# Patient Record
Sex: MALE | Race: BLACK OR AFRICAN AMERICAN | NOT HISPANIC OR LATINO | Employment: UNEMPLOYED | URBAN - METROPOLITAN AREA
[De-identification: names, ages, dates, MRNs, and addresses within clinical notes are randomized per-mention and may not be internally consistent; named-entity substitution may affect disease eponyms.]

---

## 2018-04-13 ENCOUNTER — HOSPITAL ENCOUNTER (EMERGENCY)
Facility: MEDICAL CENTER | Age: 48
End: 2018-04-13
Attending: EMERGENCY MEDICINE
Payer: COMMERCIAL

## 2018-04-13 ENCOUNTER — APPOINTMENT (OUTPATIENT)
Dept: RADIOLOGY | Facility: MEDICAL CENTER | Age: 48
End: 2018-04-13
Attending: EMERGENCY MEDICINE
Payer: COMMERCIAL

## 2018-04-13 VITALS
DIASTOLIC BLOOD PRESSURE: 106 MMHG | OXYGEN SATURATION: 96 % | RESPIRATION RATE: 16 BRPM | HEIGHT: 70 IN | WEIGHT: 201.5 LBS | HEART RATE: 72 BPM | SYSTOLIC BLOOD PRESSURE: 158 MMHG | TEMPERATURE: 99.5 F | BODY MASS INDEX: 28.85 KG/M2

## 2018-04-13 DIAGNOSIS — S20.211A CONTUSION OF RIGHT CHEST WALL, INITIAL ENCOUNTER: ICD-10-CM

## 2018-04-13 PROCEDURE — 700111 HCHG RX REV CODE 636 W/ 250 OVERRIDE (IP): Performed by: EMERGENCY MEDICINE

## 2018-04-13 PROCEDURE — A9270 NON-COVERED ITEM OR SERVICE: HCPCS | Performed by: EMERGENCY MEDICINE

## 2018-04-13 PROCEDURE — 90715 TDAP VACCINE 7 YRS/> IM: CPT | Performed by: EMERGENCY MEDICINE

## 2018-04-13 PROCEDURE — 700102 HCHG RX REV CODE 250 W/ 637 OVERRIDE(OP): Performed by: EMERGENCY MEDICINE

## 2018-04-13 PROCEDURE — 90471 IMMUNIZATION ADMIN: CPT

## 2018-04-13 PROCEDURE — 71046 X-RAY EXAM CHEST 2 VIEWS: CPT

## 2018-04-13 PROCEDURE — 99284 EMERGENCY DEPT VISIT MOD MDM: CPT

## 2018-04-13 RX ORDER — OXYCODONE AND ACETAMINOPHEN 10; 325 MG/1; MG/1
1 TABLET ORAL ONCE
Status: COMPLETED | OUTPATIENT
Start: 2018-04-13 | End: 2018-04-13

## 2018-04-13 RX ORDER — ACETAMINOPHEN 325 MG/1
650 TABLET ORAL ONCE
Status: COMPLETED | OUTPATIENT
Start: 2018-04-13 | End: 2018-04-13

## 2018-04-13 RX ORDER — TRAMADOL HYDROCHLORIDE 50 MG/1
50-100 TABLET ORAL EVERY 6 HOURS PRN
Qty: 25 TAB | Refills: 0 | Status: SHIPPED | OUTPATIENT
Start: 2018-04-13 | End: 2018-04-17

## 2018-04-13 RX ORDER — IBUPROFEN 600 MG/1
600 TABLET ORAL ONCE
Status: COMPLETED | OUTPATIENT
Start: 2018-04-13 | End: 2018-04-13

## 2018-04-13 RX ADMIN — ACETAMINOPHEN 650 MG: 325 TABLET, FILM COATED ORAL at 19:02

## 2018-04-13 RX ADMIN — CLOSTRIDIUM TETANI TOXOID ANTIGEN (FORMALDEHYDE INACTIVATED), CORYNEBACTERIUM DIPHTHERIAE TOXOID ANTIGEN (FORMALDEHYDE INACTIVATED), BORDETELLA PERTUSSIS TOXOID ANTIGEN (GLUTARALDEHYDE INACTIVATED), BORDETELLA PERTUSSIS FILAMENTOUS HEMAGGLUTININ ANTIGEN (FORMALDEHYDE INACTIVATED), BORDETELLA PERTUSSIS PERTACTIN ANTIGEN, AND BORDETELLA PERTUSSIS FIMBRIAE 2/3 ANTIGEN 0.5 ML: 5; 2; 2.5; 5; 3; 5 INJECTION, SUSPENSION INTRAMUSCULAR at 19:31

## 2018-04-13 RX ADMIN — OXYCODONE HYDROCHLORIDE AND ACETAMINOPHEN 1 TABLET: 10; 325 TABLET ORAL at 19:02

## 2018-04-13 RX ADMIN — IBUPROFEN 600 MG: 600 TABLET, FILM COATED ORAL at 19:02

## 2018-04-13 ASSESSMENT — PAIN SCALES - GENERAL
PAINLEVEL_OUTOF10: 8
PAINLEVEL_OUTOF10: 9

## 2018-04-13 NOTE — ED TRIAGE NOTES
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 Pt c/o right rib pain. Pt ambulatory.

## 2018-04-13 NOTE — LETTER
"  FORM C-4:  EMPLOYEE’S CLAIM FOR COMPENSATION/ REPORT OF INITIAL TREATMENT  EMPLOYEE’S CLAIM - PROVIDE ALL INFORMATION REQUESTED   First Name  Will Last Name  Eleuterio Birthdate             Age  1970 48 y.o. Sex  male Claim Number   Home Employee Address  [address] [facility]                                     Zip  [address] Height  1.778 m (5' 10\") Weight  91.4 kg (201 lb 8 oz) [facility]     Mailing Employee Address                           [address] [facility]               Zip  [address] Telephone  [phone] (home)  Primary Language Spoken  ENGLISH   Insurer  Unable to Obtain Third Party   [employer] CLAIMS [employer] Employee's Occupation (Job Title) When Injury or Occupational Disease Occurred  /   Employer's Name  [employer] Telephone  [phone]    Employer Address  [address]] Zip  [address]   Date of Injury  4/13/2018       Hour of Injury  3:20 PM Date Employer Notified  4/13/2018 Last Day of Work after Injury or Occupational Disease  4/13/2018 Supervisor to Whom Injury Reported  [facility]   Address or Location of Accident (if applicable)  [address]   What were you doing at the time of accident? 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 Be specific and answer in detail. Use additional sheet if necessary)  I WAS COMPLETING MY SHRED. WENT TO TURN AROUND MY LIMON CAUGHT THE CORNER OF THE LIFT AND I FELL OUT THE DOOR 4 FEET TO THE CONCRETE ON MY RIBS AND I ALSO CUT MY LEFT LEG.   If you believe that you have an occupational disease, when did you first have knowledge of the disability and it relationship to your employment?  N/A Witnesses to the Accident  A PASSER BY HE GOT THE [employer] & THEY CHECK TO SEE     Nature of Injury or Occupational Disease  Workers' Compensation  Part(s) of Body Injured or Affected  Chest, Lower Leg " (L), N/A    I certify that the above is true and correct to the best of my knowledge and that I have provided this information in order to obtain the benefits of [place]  I hereby authorize any physician, chiropractor, surgeon, practitioner, or other person, any hospital, including [facility] or [facility], any medical service organization, any insurance company, or other institution or organization to release to each other, any medical or other information, including benefits paid or payable, pertinent to this injury or disease, except information relative to diagnosis, treatment and/or counseling for AIDS, psychological conditions, alcohol or controlled substances, for which I must give specific authorization.  A Photostat of this authorization shall be as valid as the original.   Date  04/13/2018 [facility] Employee’s Signature   THIS REPORT MUST BE COMPLETED AND MAILED WITHIN 3 WORKING DAYS OF TREATMENT   Place  [facility], EMERGENCY DEPT  Name of Facility   [facility]   Date  4/13/2018 Diagnosis  (S20.211A) Contusion of right chest wall, initial encounter Is there evidence the injured employee was under the influence of alcohol and/or another controlled substance at the time of accident?   Hour  7:14 PM Description of Injury or Disease  Contusion of right chest wall, initial encounter No   Treatment  Pain medication for right sided rib pain  Have you advised the patient to remain off work five days or more?         No   X-Ray Findings  Negative   If Yes   From Date    To Date      From information given by the employee, together with medical evidence, can you directly connect this injury or occupational disease as job incurred?  Yes If No, is the employee capable of: Full Duty  No Modified Duty  Yes   Is additional medical care by a  "physician indicated?  Yes If Modified Duty, Specify any Limitations / Restrictions  No heavy lifting until until cleared by occupational therapy     Do you know of any previous injury or disease contributing to this condition or occupational disease?  No   Date  4/13/2018 Print Doctor’s Name  Ronaldo Roegrs I certify the employer’s copy of this form was mailed on:   Address  [address] [place] [id number] [phone] Insurer’s Use Only   [facility]  [address]    Provider’s Tax ID Number  [id number] Telephone  Dept: [phone]    Doctor’s Signature  e-RONALDO Avelar M.D. Degree   MD    Original - TREATING PHYSICIAN OR CHIROPRACTOR   Pg 2-Insurer/TPA   Pg 3-Employer   Pg 4-Employee                                                                                                  Form C-4 (rev01/03)     BRIEF DESCRIPTION OF RIGHTS AND BENEFITS  (Pursuant to [place] 616C.[place])    Notice of Injury or Occupational Disease (Incident Report Form C-1): If an injury or occupational disease (OD) arises out of and in the course of employment, you must provide written notice to your employer as soon as practicable, but no later than 7 days after the accident or OD. Your employer shall maintain a sufficient supply of the required forms.    Claim for Compensation (Form C-4): If medical treatment is sought, the form C-4 is available at the place of initial treatment. A completed \"Claim for Compensation\" (Form C-4) must be filed within 90 days after an accident or OD. The treating physician or chiropractor must, within 3 working days after treatment, complete and mail to the employer, the employer's insurer and third-party , the Claim for Compensation.    Medical Treatment: If you require medical treatment for your on-the-job injury or OD, you may be required to select a physician or chiropractor from a list provided by your workers’ compensation insurer, if it has contracted with an Organization " for Managed Care (MCO) or Preferred Provider Organization (PPO) or providers of health care. If your employer has not entered into a contract with an MCO or PPO, you may select a physician or chiropractor from the Panel of Physicians and Chiropractors. Any medical costs related to your industrial injury or OD will be paid by your insurer.    Temporary Total Disability (TTD): If your doctor has certified that you are unable to work for a period of at least 5 consecutive days, or 5 cumulative days in a 20-day period, or places restrictions on you that your employer does not accommodate, you may be entitled to TTD compensation.    Temporary Partial Disability (TPD): If the wage you receive upon reemployment is less than the compensation for TTD to which you are entitled, the insurer may be required to pay you TPD compensation to make up the difference. TPD can only be paid for a maximum of 24 months.    Permanent Partial Disability (PPD): When your medical condition is stable and there is an indication of a PPD as a result of your injury or OD, within 30 days, your insurer must arrange for an evaluation by a rating physician or chiropractor to determine the degree of your PPD. The amount of your PPD award depends on the date of injury, the results of the PPD evaluation and your age and wage.    Permanent Total Disability (PTD): If you are medically certified by a treating physician or chiropractor as permanently and totally disabled and have been granted a PTD status by your insurer, you are entitled to receive monthly benefits not to exceed 66 2/3% of your average monthly wage. The amount of your PTD payments is subject to reduction if you previously received a PPD award.    Vocational Rehabilitation Services: You may be eligible for vocational rehabilitation services if you are unable to return to the job due to a permanent physical impairment or permanent restrictions as a result of your injury or occupational  disease.    Transportation and Per Mundo Reimbursement: You may be eligible for travel expenses and per mundo associated with medical treatment.  Reopening: You may be able to reopen your claim if your condition worsens after claim closure.    Appeal Process: If you disagree with a written determination issued by the insurer or the insurer does not respond to your request, you may appeal to the Department of Administration, , by following the instructions contained in your determination letter. You must appeal the determination within 70 days from the date of the determination letter at [address], [address], [place], [place] [address], or [address], [address], [place], [place] [address]. If you disagree with the  decision, you may appeal to the Department of Administration, . You must file your appeal within 30 days from the date of the  decision letter at [address], [address], [place], [place] [address], or [address] [address], [place] [address]. If you disagree with a decision of an , you may file a petition for judicial review with the District Court. You must do so within 30 days of the Appeal Officer’s decision. You may be represented by an  at your own expense or you may contact the [facility] for possible representation.    [place]  for Injured Workers (NAIW): If you disagree with a  decision, you may request that NAIW represent you without charge at an  Hearing. For information regarding denial of benefits, you may contact the [facility] at: [address], [address], [place] [address], [phone], or [address], [place] [address], [phone]    To File a Complaint with the Division: If you wish to file a complaint with the  of the Division of Industrial Relations (DIR), please contact the Workers’  Compensation Section, [address], [address], [place], [place] [address], telephone (927) [phone], or [address], [address], [place], [place] [address], telephone [phone].    For assistance with Workers’ Compensation Issues: you may contact the Office of the Governor Consumer Health Assistance, [address] [place] [address], [address], [place], [place] [address], Toll Free 1-597.972.3789, Web site: http://govcha.[facility].nv., E-mail [email or web address].                                                                                                                                                                               __________________________________________________________________                                    _________________            Employee Name / Signature                                                                                                                            Date                                       D-2 (rev. 10/07)

## 2018-04-14 NOTE — ED PROVIDER NOTES
"ED Provider Note    Scribed for Ziyad Rogers M.D. by Ashtyn Lamb. 4/13/2018  5:20 PM    Primary care provider: Pcp Pt States None  Means of arrival: Walk-in   History obtained from: Patient  History limited by: None    CHIEF COMPLAINT  Chief Complaint   Patient presents with   • T-5000 FALL   • Rib Pain     HPI  Jairon Mcclellan Jr. is a 48 y.o. male who presents to the Emergency Department for evaluation of right sided rib pain. Patient reports he was at work and fell 3-4 feet out of a truck, landing on his right side. Denies head injury or loss of consciousness post fall. Denies shortness of breath. The patient has no history of medical problems.       REVIEW OF SYSTEMS  Pertinent negatives include no shortness of breath. See HPI for further details. E    PAST MEDICAL HISTORY   The patient has no history of medical problems.     SURGICAL HISTORY  patient denies any surgical history    SOCIAL HISTORY  None noted     FAMILY HISTORY  None noted     CURRENT MEDICATIONS  No current facility-administered medications on file prior to encounter.      No current outpatient prescriptions on file prior to encounter.     ALLERGIES  No Known Allergies    PHYSICAL EXAM  VITAL SIGNS: /97   Pulse 78   Temp 37.3 °C (99.2 °F)   Resp 18   Ht 1.778 m (5' 10\")   Wt 91.4 kg (201 lb 8 oz)   SpO2 99%   BMI 28.91 kg/m²   Constitutional: Well developed, Well nourished, No acute distress, Non-toxic appearance.   HENT: normal  Eyes: normal  Neck: normal  Thorax & Lungs: Point tenderness to T8-T9 area.   Skin: normal  Back: normal  Extremities: normal   Neurologic: Alert. No focal deficits.     DIAGNOSTIC STUDIES / PROCEDURES     RADIOLOGY  DX-CHEST-2 VIEWS   Final Result      No active disease.        The radiologist's interpretation of all radiological studies have been reviewed by me.    COURSE & MEDICAL DECISION MAKING  Nursing notes, VS, PMSFHx reviewed in chart.    5:20 PM Patient seen and examined at bedside. Ordered for " DX-chest 2 views to evaluate. Patient was treated with acetaminophen 650 mg, ibuprofen 600 mg, oxycodone-acetaminophen  mg  for his symptoms.      7:01 PM - Recheck: Patient is resting comfortably. I updated him on his results, which did not indicate fracture on plain film. I explained that he is now stable for discharge with a prescription for Ultram. I advised him to follow up with his primary care provider and to return to the ED for worsening pain or new onset of symptoms . He understands and will comply.     Patient has had high blood pressure while in the emergency department, felt likely secondary to medical condition. Counseled patient to monitor blood pressure at home and follow up with primary care physician.     I have signed into and reviewed the patient's prescription monitoring program data prior to prescribing a scheduled drug. The patient will not drink alcohol nor drive with prescribed medications. The patient will return for new or worsening symptoms and is stable at the time of discharge.    In prescribing controlled substances to this patient, I certify that I have obtained and reviewed the medical history of Jairon Mcclellan Jr.. I have also made a good bertin effort to obtain applicable records from other providers who have treated the patient and no other records are available at this time.     I have conducted a physical exam and documented it. I have reviewed Mr. Mcclellan’s prescription history as maintained by the [place] Prescription Monitoring Program.     I have assessed the patient’s risk for abuse, dependency, and addiction using the validated Opioid Risk Tool available at https://www.mdcalc.com/abgaah-wcdx-zmnf-ort-narcotic-abuse.     Given the above, I believe the benefits of controlled substance therapy outweigh the risks. The reasons for prescribing controlled substances include non-narcotic, oral analgesic alternatives have been inadequate for pain control. Accordingly, I have  discussed the risk and benefits, treatment plan, and alternative therapies with the patient.     X-ray did not reveal any evidence of a rib fracture. I think he may have a rib contusion versus occult fracture and seems to be still uncomfortable so I'll give him Ultram abuse with ibuprofen at home for pain control and close follow-up with occupational therapy to examine him prior to return to work as he is on modified duty no heavy lifting at this time    DISPOSITION:  Patient will be discharged home in stable condition.    FINAL IMPRESSION  1. Contusion of right chest wall, initial encounter        Ashtyn GARCIA (Scribe), am scribing for, and in the presence of, Ziyad Rogers M.D..    Electronically signed by: Ashtyn Lamb (Scribe), 4/13/2018    IZiyad M.D. personally performed the services described in this documentation, as scribed by Ashtyn Lamb in my presence, and it is both accurate and complete.    The note accurately reflects work and decisions made by me.  Ziyad Rogers  4/13/2018  7:09 PM

## 2018-04-14 NOTE — ED NOTES
Pt discharged home. Assessment complete. Pt ambulates self. VS stable. Pt verbalized understanding discharge instructions. Prescriptions given pt verbalizes teaching provided. Narcotic consent signed and in chart. Pt verbalized understanding not to drive while taking controlled substances. Pt will be driven home by SO.

## 2018-04-17 ENCOUNTER — APPOINTMENT (OUTPATIENT)
Dept: RADIOLOGY | Facility: IMAGING CENTER | Age: 48
End: 2018-04-17
Attending: PREVENTIVE MEDICINE
Payer: COMMERCIAL

## 2018-04-17 ENCOUNTER — OCCUPATIONAL MEDICINE (OUTPATIENT)
Dept: OCCUPATIONAL MEDICINE | Facility: CLINIC | Age: 48
End: 2018-04-17
Payer: COMMERCIAL

## 2018-04-17 VITALS
SYSTOLIC BLOOD PRESSURE: 138 MMHG | DIASTOLIC BLOOD PRESSURE: 88 MMHG | HEIGHT: 68 IN | RESPIRATION RATE: 14 BRPM | WEIGHT: 196 LBS | HEART RATE: 68 BPM | OXYGEN SATURATION: 98 % | BODY MASS INDEX: 29.7 KG/M2

## 2018-04-17 DIAGNOSIS — Z02.83 ENCOUNTER FOR DRUG SCREENING: ICD-10-CM

## 2018-04-17 DIAGNOSIS — S20.211D CONTUSION OF RIGHT CHEST WALL, SUBSEQUENT ENCOUNTER: ICD-10-CM

## 2018-04-17 PROCEDURE — 80305 DRUG TEST PRSMV DIR OPT OBS: CPT | Performed by: PREVENTIVE MEDICINE

## 2018-04-17 PROCEDURE — 99204 OFFICE O/P NEW MOD 45 MIN: CPT | Performed by: PREVENTIVE MEDICINE

## 2018-04-17 PROCEDURE — 71101 X-RAY EXAM UNILAT RIBS/CHEST: CPT | Mod: TC,RT | Performed by: NURSE PRACTITIONER

## 2018-04-17 RX ORDER — AMLODIPINE BESYLATE 10 MG/1
10 TABLET ORAL DAILY
COMMUNITY
End: 2020-08-20

## 2018-04-17 RX ORDER — HYDROCHLOROTHIAZIDE 12.5 MG/1
12.5 TABLET ORAL DAILY
COMMUNITY
End: 2019-04-26

## 2018-04-17 RX ORDER — DICLOFENAC SODIUM 75 MG/1
75 TABLET, DELAYED RELEASE ORAL 2 TIMES DAILY
Qty: 60 TAB | Refills: 1 | Status: SHIPPED | OUTPATIENT
Start: 2018-04-17 | End: 2019-04-26

## 2018-04-17 ASSESSMENT — PAIN SCALES - GENERAL: PAINLEVEL: 7=MODERATE-SEVERE PAIN

## 2018-04-17 NOTE — PROGRESS NOTES
"Subjective:      Jairon Mcclellan Jr. is a 48 y.o. male who presents with Follow-Up (WC New to you-DOI 4/13/18 R Side Rib Pain room 2)      Date of injury 4/13/2018. Mechanism of injury-fell 4 feet onto right chest wall. 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 He was seen in the emergency department 4 days ago her chest x-ray was negative. He continues to complain of 7/10 right-sided chest wall pain pleuritic in quality. No shortness of breath or hemoptysis.     HPI    ROS  Comprehensive medical history form reviewed. Pertinent positives and negatives included in HPI.    PFSH: reviewed in Epic    PMH:  has no past medical history on file.  MEDS:   Current Outpatient Prescriptions:   •  MethylPREDNISolone (MEDROL PO), Take  by mouth., Disp: , Rfl:   •  hydroCHLOROthiazide (HYDRODIURIL) 12.5 MG tablet, Take 12.5 mg by mouth every day., Disp: , Rfl:   •  amLODIPine (NORVASC) 10 MG Tab, Take 10 mg by mouth every day., Disp: , Rfl:   •  diclofenac EC (VOLTAREN) 75 MG Tablet Delayed Response, Take 1 Tab by mouth 2 times a day., Disp: 60 Tab, Rfl: 1  •  tramadol (ULTRAM) 50 MG Tab, Take 1-2 Tabs by mouth every 6 hours as needed for up to 4 days., Disp: 25 Tab, Rfl: 0  ALLERGIES: No Known Allergies  SURGHX: History reviewed. No pertinent surgical history.  SOCHX:  reports that he has been smoking Cigarettes.  He has a 1.00 pack-year smoking history. He has never used smokeless tobacco. He reports that he drinks alcohol. He reports that he does not use drugs.  Work Status:  for [email or web address]  FH: No pertinent hereditary disorders.        Objective:     /88   Pulse 68   Resp 14   Ht 1.727 m (5' 8\")   Wt 88.9 kg (196 lb)   SpO2 98%   BMI 29.80 kg/m²      Physical Exam    Appearance: Well-developed, well-nourished.   Mental Status: Mood and Affect normal. Pleasant. Cooperative. Appropriate.   ENT: Oropharynx clear. Moist mucous membranes. Hearing normal.   Eyes: Pupils reactive. Conjunctiva " normal. No scleral icterus.   Neck: Trachea Midline. No thyromegaly. No masses.  Cardiovascular: Normal rate. Regular rhythm. Normal heart sounds.   Chest: Effort normal. Breath sounds clear.   Skin: Skin is warm and dry. No rash.   Musculoskeletal: Right thoracic area shows no abrasions or ecchymosis. No localized bony tenderness.   Right rib and chest x-ray done today negative. Final interpretation confirms       Assessment/Plan:     1. Contusion of right chest wall, subsequent encounter  New to Occupational Health from emergency department  Condition ongoing    - SV-KISV-HYMFEOJLBS (WITH 1-VIEW CXR) RIGHT; Future  - diclofenac EC (VOLTAREN) 75 MG Tablet Delayed Response; Take 1 Tab by mouth 2 times a day.  Dispense: 60 Tab; Refill: 1  - Modified work  - Recheck in one week

## 2018-04-17 NOTE — LETTER
[address],   [address] [place] [address]  Phone:  [phone] - Fax:  [phone]   Occupational Health [facility] Progress Report and Disability Certification  Date of Service: 4/17/2018   No Show:  No  Date / Time of Next Visit: 4/27/2018@4:25pm   Claim Information   Patient Name: Jairon Mcclellan Jr.  Claim Number:     Employer: [employer]  Date of Injury: 4/13/2018     Insurer / TPA: [employer]  ID / SSN:     Occupation: /  Diagnosis: The encounter diagnosis was Contusion of right chest wall, subsequent encounter.    Medical Information   Related to Industrial Injury? Yes    Subjective Complaints:  Date of injury 4/13/2018. Mechanism of injury-fell 4 feet onto right chest wall. 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 He was seen in the emergency department 4 days ago her chest x-ray was negative. He continues to complain of 7/10 right-sided chest wall pain pleuritic in quality. No shortness of breath or hemoptysis.   Objective Findings: Appearance: Well-developed, well-nourished.   Mental Status: Mood and Affect normal. Pleasant. Cooperative. Appropriate.   ENT: Oropharynx clear. Moist mucous membranes. Hearing normal.   Eyes: Pupils reactive. Conjunctiva normal. No scleral icterus.   Neck: Trachea Midline. No thyromegaly. No masses.  Cardiovascular: Normal rate. Regular rhythm. Normal heart sounds.   Chest: Effort normal. Breath sounds clear.   Skin: Skin is warm and dry. No rash.   Musculoskeletal: Right thoracic area shows no abrasions or ecchymosis. No localized bony tenderness.      Pre-Existing Condition(s):     Assessment:   Condition Same    Status: Additional Care Required  Permanent Disability:No    Plan: Medication    Diagnostics:      Comments:       Disability Information   Status: Released to Restricted Duty    From:  4/17/2018  Through: 4/27/2018 Restrictions are: Temporary   Physical Restrictions    Sitting:    Standing:    Stooping:    Bending:      Squatting:    Walking:    Climbing:    Pushing:  < or = to 2 hrs/day   Pulling:  < or = to 2 hrs/day Other:    Reaching Above Shoulder (L):   Reaching Above Shoulder (R):       Reaching Below Shoulder (L):    Reaching Below Shoulder (R):      Not to exceed Weight Limits   Carrying(hrs):   Weight Limit(lb):   Lifting(hrs):   Weight  Limit(lb): < or = to 25 pounds   Comments:      Repetitive Actions   Hands: i.e. Fine Manipulations from Grasping:     Feet: i.e. Operating Foot Controls:     Driving / Operate Machinery:     Physician Name: Benjy Wheat M.D. Physician Signature: BENJY Rivero M.D. e-Signature: Dr. Faizan Aguila, Medical Director   Clinic Name / Location: [address],   [address] [place] [address] Clinic Phone Number: Dept: [phone]   Appointment Time: 2:25 Pm Visit Start Time: 1:53 PM   Check-In Time:  1:39 Pm Visit Discharge Time:  3:27pm   Original-Treating Physician or Chiropractor    Page 2-Insurer/TPA    Page 3-Employer    Page 4-Employee

## 2018-04-17 NOTE — LETTER
[address],   [address] [place] [address]  Phone:  [phone] - Fax:  [phone]   [facility] Progress Report and Disability Certification  Date of Service: 4/17/2018   No Show:  No  Date / Time of Next Visit: 4/27/2018   Claim Information   Patient Name: Jairon Mcclellan Jr.  Claim Number:     Employer: [employer]  Date of Injury: 4/13/2018     Insurer / TPA: [employer]  ID / SSN:     Occupation: /  Diagnosis: The encounter diagnosis was Contusion of right chest wall, subsequent encounter.    Medical Information   Related to Industrial Injury? Yes    Subjective Complaints:  Date of injury 4/13/2018. Mechanism of injury-fell 4 feet onto right chest wall. 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 He was seen in the emergency department 4 days ago her chest x-ray was negative. He continues to complain of 7/10 right-sided chest wall pain pleuritic in quality. No shortness of breath or hemoptysis.   Objective Findings: Appearance: Well-developed, well-nourished.   Mental Status: Mood and Affect normal. Pleasant. Cooperative. Appropriate.   ENT: Oropharynx clear. Moist mucous membranes. Hearing normal.   Eyes: Pupils reactive. Conjunctiva normal. No scleral icterus.   Neck: Trachea Midline. No thyromegaly. No masses.  Cardiovascular: Normal rate. Regular rhythm. Normal heart sounds.   Chest: Effort normal. Breath sounds clear.   Skin: Skin is warm and dry. No rash.   Musculoskeletal: Right thoracic area shows no abrasions or ecchymosis. No localized bony tenderness.      Pre-Existing Condition(s):     Assessment:   Condition Same    Status: Additional Care Required  Permanent Disability:No    Plan: Medication    Diagnostics:      Comments:       Disability Information   Status: Released to Restricted Duty    From:  4/17/2018  Through: 4/27/2018 Restrictions are: Temporary   Physical Restrictions    Sitting:    Standing:    Stooping:    Bending:      Squatting:    Walking:    Climbing:    Pushing:  < or = to 2 hrs/day   Pulling:  < or = to 2 hrs/day Other:    Reaching Above Shoulder (L):   Reaching Above Shoulder (R):       Reaching Below Shoulder (L):    Reaching Below Shoulder (R):      Not to exceed Weight Limits   Carrying(hrs):   Weight Limit(lb):   Lifting(hrs):   Weight  Limit(lb): < or = to 25 pounds   Comments:      Repetitive Actions   Hands: i.e. Fine Manipulations from Grasping:     Feet: i.e. Operating Foot Controls:     Driving / Operate Machinery:   Comments:No limitations   Physician Name: Benjy Wheat M.D. Physician Signature: BENJY Rivero M.D. e-Signature: Dr. Faizan Aguila, Medical Director   Clinic Name / Location: [address],   [address] [place] [address] Clinic Phone Number: Dept: [phone]   Appointment Time: 2:25 Pm Visit Start Time: 1:53 PM   Check-In Time:  1:39 Pm Visit Discharge Time:  3:27pm   Original-Treating Physician or Chiropractor    Page 2-Insurer/TPA    Page 3-Employer    Page 4-Employee

## 2018-04-27 ENCOUNTER — OCCUPATIONAL MEDICINE (OUTPATIENT)
Dept: OCCUPATIONAL MEDICINE | Facility: CLINIC | Age: 48
End: 2018-04-27
Payer: COMMERCIAL

## 2018-04-27 VITALS
OXYGEN SATURATION: 99 % | TEMPERATURE: 98.2 F | WEIGHT: 201 LBS | HEIGHT: 68 IN | SYSTOLIC BLOOD PRESSURE: 122 MMHG | RESPIRATION RATE: 14 BRPM | HEART RATE: 69 BPM | DIASTOLIC BLOOD PRESSURE: 88 MMHG | BODY MASS INDEX: 30.46 KG/M2

## 2018-04-27 DIAGNOSIS — S20.211D CONTUSION OF RIGHT CHEST WALL, SUBSEQUENT ENCOUNTER: ICD-10-CM

## 2018-04-27 PROCEDURE — 99213 OFFICE O/P EST LOW 20 MIN: CPT | Performed by: PREVENTIVE MEDICINE

## 2018-04-27 ASSESSMENT — ENCOUNTER SYMPTOMS
CONSTITUTIONAL NEGATIVE: 1
RESPIRATORY NEGATIVE: 1

## 2018-04-27 NOTE — LETTER
[address],   [address] [place] [address]  Phone:  [phone] - Fax:  [phone]   [facility] Progress Report and Disability Certification  Date of Service: 4/27/2018   No Show:  No  Date / Time of Next Visit: 5/4/2018 @ 3:45 PM    Claim Information   Patient Name: Jairon Mcclellan Jr.  Claim Number:     Employer: [employer]  Date of Injury: 4/13/2018     Insurer / TPA: [employer]  ID / SSN:     Occupation: /  Diagnosis: The encounter diagnosis was Contusion of right chest wall, subsequent encounter.    Medical Information   Related to Industrial Injury? Yes    Subjective Complaints:  Date of injury 4/13/2018. Mechanism of injury-fell 4 feet onto right chest wall. 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 He reports he is improving. He is not yet ready for full duty   Objective Findings: Appearance: Well-developed, well-nourished.   Mental Status: Mood and Affect normal. Pleasant. Cooperative. Appropriate.   Chest: Effort normal. Breath sounds clear.   Musculoskeletal: Mild tenderness right thoracic chest wall.     Pre-Existing Condition(s):     Assessment:   Condition Improved    Status: Additional Care Required  Permanent Disability:No    Plan:      Diagnostics:      Comments:       Disability Information   Status: Released to Restricted Duty    From:  4/27/2018  Through: 5/4/2018 Restrictions are: Temporary   Physical Restrictions   Sitting:    Standing:    Stooping:    Bending:      Squatting:    Walking:    Climbing:    Pushing:  < or = to 2 hrs/day   Pulling:  < or = to 2 hrs/day Other:    Reaching Above Shoulder (L):   Reaching Above Shoulder (R):       Reaching Below Shoulder (L):    Reaching Below Shoulder (R):      Not to exceed Weight Limits   Carrying(hrs):   Weight Limit(lb):   Lifting(hrs):   Weight  Limit(lb): < or = to 25 pounds   Comments:      Repetitive Actions   Hands: i.e. Fine  Manipulations from Grasping:     Feet: i.e. Operating Foot Controls:     Driving / Operate Machinery:     Physician Name: Benjy Wheat M.D. Physician Signature: BENJY Rivero M.D. e-Signature: Dr. Faizan Aguila, Medical Director   Clinic Name / Location: [address],   [address]  [place] [address] Clinic Phone Number: Dept: [phone]   Appointment Time: 4:25 Pm Visit Start Time: 4:37 PM   Check-In Time:  4:30 Pm Visit Discharge Time:  5:12 PM   Original-Treating Physician or Chiropractor    Page 2-Insurer/TPA    Page 3-Employer    Page 4-Employee

## 2018-04-27 NOTE — PROGRESS NOTES
"Subjective:      Jairon Mcclellan Jr. is a 48 y.o. male who presents with Other (WC DOI 4/47/18 Rt ribs, feeling better room pr1)      Date of injury 4/13/2018. Mechanism of injury-fell 4 feet onto right chest wall. 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 He reports he is improving. He is not yet ready for full duty     HPI    Review of Systems   Constitutional: Negative.    Respiratory: Negative.      PFSH:  WORK STATUS: Restricted activity  PMH:  has no past medical history on file.  MEDS:   Current Outpatient Prescriptions:   •  MethylPREDNISolone (MEDROL PO), Take  by mouth., Disp: , Rfl:   •  amLODIPine (NORVASC) 10 MG Tab, Take 10 mg by mouth every day., Disp: , Rfl:   •  hydroCHLOROthiazide (HYDRODIURIL) 12.5 MG tablet, Take 12.5 mg by mouth every day., Disp: , Rfl:   •  diclofenac EC (VOLTAREN) 75 MG Tablet Delayed Response, Take 1 Tab by mouth 2 times a day., Disp: 60 Tab, Rfl: 1       Objective:     /88   Pulse 69   Temp 36.8 °C (98.2 °F)   Resp 14   Ht 1.727 m (5' 8\")   Wt 91.2 kg (201 lb)   SpO2 99%   BMI 30.56 kg/m²      Physical Exam    Appearance: Well-developed, well-nourished.   Mental Status: Mood and Affect normal. Pleasant. Cooperative. Appropriate.   Chest: Effort normal. Breath sounds clear.   Musculoskeletal: Mild tenderness right thoracic chest wall.         Assessment/Plan:     1. Contusion of right chest wall, subsequent encounter  Condition improving but not resolved  Remains at restricted work  Recheck in one week with probable discharge      "

## 2018-05-04 ENCOUNTER — OCCUPATIONAL MEDICINE (OUTPATIENT)
Dept: OCCUPATIONAL MEDICINE | Facility: CLINIC | Age: 48
End: 2018-05-04
Payer: COMMERCIAL

## 2018-05-04 VITALS
SYSTOLIC BLOOD PRESSURE: 122 MMHG | OXYGEN SATURATION: 98 % | HEIGHT: 68 IN | TEMPERATURE: 98.9 F | DIASTOLIC BLOOD PRESSURE: 88 MMHG | WEIGHT: 203 LBS | RESPIRATION RATE: 16 BRPM | BODY MASS INDEX: 30.77 KG/M2 | HEART RATE: 75 BPM

## 2018-05-04 DIAGNOSIS — S20.211D CHEST WALL CONTUSION, RIGHT, SUBSEQUENT ENCOUNTER: ICD-10-CM

## 2018-05-04 PROCEDURE — 99213 OFFICE O/P EST LOW 20 MIN: CPT | Performed by: PREVENTIVE MEDICINE

## 2018-05-04 ASSESSMENT — PAIN SCALES - GENERAL: PAINLEVEL: 3=SLIGHT PAIN

## 2018-05-04 NOTE — LETTER
[address],   [address] [place] [address]  Phone:  [phone] - Fax:  [phone]   [facility] Progress Report and Disability Certification  Date of Service: 5/4/2018   No Show:  No  Date / Time of Next Visit:  Discharged    Claim Information   Patient Name: Jairon Mcclellan Jr.  Claim Number:     Employer: [employer]  Date of Injury: 4/13/2018     Insurer / TPA: [employer]  ID / SSN:     Occupation: /  Diagnosis: The encounter diagnosis was Chest wall contusion, right, subsequent encounter.    Medical Information   Related to Industrial Injury? Yes    Subjective Complaints:  Date of injury 4/13/2018. Mechanism of injury-fell 4 feet onto right chest wall. 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 He reports interval improvement. He is ready for full duty.   Objective Findings: Appearance: Well-developed, well-nourished.   Mental Status: Mood and Affect normal. Pleasant. Cooperative. Appropriate.   Chest: Effort normal. Breath sounds clear.        Pre-Existing Condition(s):     Assessment:   Condition Improved    Status: Discharged /  MMI  Permanent Disability:No    Plan:      Diagnostics:      Comments:       Disability Information   Status: Released to Full Duty    From:  5/4/2018  Through:   Restrictions are:     Physical Restrictions   Sitting:    Standing:    Stooping:    Bending:      Squatting:    Walking:    Climbing:    Pushing:      Pulling:    Other:    Reaching Above Shoulder (L):   Reaching Above Shoulder (R):       Reaching Below Shoulder (L):    Reaching Below Shoulder (R):      Not to exceed Weight Limits   Carrying(hrs):   Weight Limit(lb):   Lifting(hrs):   Weight  Limit(lb):     Comments:      Repetitive Actions   Hands: i.e. Fine Manipulations from Grasping:     Feet: i.e. Operating Foot Controls:     Driving / Operate Machinery:     Physician Name: Benjy Wheat M.D. Physician  Signature: MAYITO Rivero M.D. e-Signature: Dr. Faizan Aguila, Medical Director   Clinic Name / Location: [address],   [address]  [place] [address] Clinic Phone Number: Dept: [phone]   Appointment Time: 3:45 Pm Visit Start Time: 3:25 PM   Check-In Time:  2:57 Pm Visit Discharge Time:  4:04pm   Original-Treating Physician or Chiropractor    Page 2-Insurer/TPA    Page 3-Employer    Page 4-Employee

## 2018-05-04 NOTE — PROGRESS NOTES
"Subjective:      Jairon Mcclellan Jr. is a 48 y.o. male who presents with Follow-Up (WC DOI 4/17/18 ribs feeling better room #2)      Date of injury 4/13/2018. Mechanism of injury-fell 4 feet onto right chest wall. 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 He reports interval improvement. He is ready for full duty.     HPI    ROS  PFSH:  WORK STATUS: Restricted activity  PMH:  has no past medical history on file.  MEDS:   Current Outpatient Prescriptions:   •  amLODIPine (NORVASC) 10 MG Tab, Take 10 mg by mouth every day., Disp: , Rfl:   •  MethylPREDNISolone (MEDROL PO), Take  by mouth., Disp: , Rfl:   •  hydroCHLOROthiazide (HYDRODIURIL) 12.5 MG tablet, Take 12.5 mg by mouth every day., Disp: , Rfl:   •  diclofenac EC (VOLTAREN) 75 MG Tablet Delayed Response, Take 1 Tab by mouth 2 times a day., Disp: 60 Tab, Rfl: 1       Objective:     /88   Pulse 75   Temp 37.2 °C (98.9 °F)   Resp 16   Ht 1.727 m (5' 8\")   Wt 92.1 kg (203 lb)   SpO2 98%   BMI 30.87 kg/m²      Physical Exam    Appearance: Well-developed, well-nourished.   Mental Status: Mood and Affect normal. Pleasant. Cooperative. Appropriate.   Chest: Effort normal. Breath sounds clear.            Assessment/Plan:     1. Chest wall contusion, right, subsequent encounter  Condition improved  Regular work  Release from care      "

## 2019-04-26 ENCOUNTER — OFFICE VISIT (OUTPATIENT)
Dept: VASCULAR LAB | Facility: MEDICAL CENTER | Age: 49
End: 2019-04-26
Attending: FAMILY MEDICINE
Payer: COMMERCIAL

## 2019-04-26 VITALS
HEART RATE: 71 BPM | HEIGHT: 68 IN | WEIGHT: 204.4 LBS | DIASTOLIC BLOOD PRESSURE: 93 MMHG | SYSTOLIC BLOOD PRESSURE: 130 MMHG | BODY MASS INDEX: 30.98 KG/M2

## 2019-04-26 DIAGNOSIS — I10 ESSENTIAL (PRIMARY) HYPERTENSION: ICD-10-CM

## 2019-04-26 DIAGNOSIS — F17.200 SMOKER: ICD-10-CM

## 2019-04-26 DIAGNOSIS — R06.83 SNORING: ICD-10-CM

## 2019-04-26 PROCEDURE — 99214 OFFICE O/P EST MOD 30 MIN: CPT | Performed by: FAMILY MEDICINE

## 2019-04-26 PROCEDURE — 99212 OFFICE O/P EST SF 10 MIN: CPT | Performed by: FAMILY MEDICINE

## 2019-04-26 RX ORDER — CHLORTHALIDONE 25 MG/1
25 TABLET ORAL DAILY
Qty: 90 TAB | Refills: 3 | Status: SHIPPED | OUTPATIENT
Start: 2019-04-26 | End: 2020-04-20

## 2019-04-26 RX ORDER — FENOFIBRATE 48 MG/1
48 TABLET, COATED ORAL DAILY
COMMUNITY
End: 2020-08-20

## 2019-04-26 RX ORDER — LOSARTAN POTASSIUM 50 MG/1
50 TABLET ORAL DAILY
Qty: 90 TAB | Refills: 3 | Status: SHIPPED | OUTPATIENT
Start: 2019-04-26 | End: 2020-04-20

## 2019-04-26 RX ORDER — METOPROLOL TARTRATE 50 MG/1
50 TABLET, FILM COATED ORAL 2 TIMES DAILY
Qty: 180 TAB | Refills: 3 | Status: SHIPPED | OUTPATIENT
Start: 2019-04-26 | End: 2020-04-20

## 2019-04-26 RX ORDER — LOSARTAN POTASSIUM 25 MG/1
25 TABLET ORAL DAILY
COMMUNITY
End: 2019-04-26

## 2019-04-26 ASSESSMENT — ENCOUNTER SYMPTOMS
WEAKNESS: 0
HEADACHES: 0
NERVOUS/ANXIOUS: 0
BLURRED VISION: 0
ABDOMINAL PAIN: 0
MYALGIAS: 0
DEPRESSION: 0
ORTHOPNEA: 0
SEIZURES: 0
CHILLS: 0
WHEEZING: 0
HEMOPTYSIS: 0
INSOMNIA: 0
COUGH: 0
BLOOD IN STOOL: 0
TREMORS: 0
FOCAL WEAKNESS: 0
NAUSEA: 0
PALPITATIONS: 0
FEVER: 0
SHORTNESS OF BREATH: 0
VOMITING: 0
SORE THROAT: 0
DIZZINESS: 0
DOUBLE VISION: 0
DIARRHEA: 0
BRUISES/BLEEDS EASILY: 0

## 2019-04-26 NOTE — PROGRESS NOTES
RESISTANT HTN CLINIC - INITIAL VISIT   4/26/19     Assessment / Plan:   1. Blood Pressure Control:  Office BP Goal ACC/AHA (2017) goal <130/80  Home BP at goal:  no  Office BP at goal:  no  Monitoring:   - continue home BP monitoring, reviewed correct technique:  Yes   - order 24h ABPM:  UNDECIDED  - monitor lytes/gfr routinely     2. Work up of Secondary Causes of Resistant Hypertension:   Renovascular HTN: Not Yet Assessed  Primary Aldosteronism: Not Yet Assessed  Thyroid Function: Not Yet Assessed  Obstructive Sleep Apnea: had sleep study, 2005, normal exam   Pheochromocytoma: Not Yet Assessed  Other: n/a   Recommendations At This Visit: update labs, renal duplex, sleep study referral         3. Medication Use / Adherence:  Assessment: Complete  Recommendations: Instructed to Continue Taking All Medications As Prescribed         4. End Organ Damage:   Left Ventricular Hypertrophy: Not Assessed on  Echocardiogram Date: ordered  Albuminuria: Not Yet Assessed on Date: ordered   Renal Function: Chronic Kidney Disease  ordered labs   Established Cardiovascular Disease: None    5. Lifestyle Recommendations:  Physical Activity: continue healthy activity to improve CV fitness, see care instructions     Weight Management and Nutrition: Dietary plan was discussed with patient at this visit including DASH, low sodium as outlined in care instructions     6. Standard HTN Pharmacotherapy:  ACEI/ARB: increase losartan to 50mg, less effective in AA populations  Thiazide Type Diuretic: stop hctz, start chlorthalidone 25mg QAM  Calcium Channel Blocker (CCB): continue amlodipine 10mg     7. Additional Agents:  Aldosterone Antagonist: consider addition of spironolactone 25mg as next agent   Loop Diuretic: not indicated   Perpheral Alpha Blocker: consider doxazosin 1mg QHS as next agent   Other CCB: consider use of verap or dilt, more effective in AA populations  Direct Vasodilator: not indicated    Centrally Acting Alpha Agonist:  avoid   Other: BB- reduce metoprolol to 50mg BID, continue to wean as has limited effectiveness on BP and pt has no CAD hx      8. Other CV Risk Factors:   Lipids: update labs      Smoking: contemplative, continued reduction, consider med tx and counseling      Antiplatelet Therapy: not indicated     9. Other Issues:  None at this time     Studies Ordered At This Visit: echo, renal art duplex - ordered, APRN informed to track  Blood Work to Be Obtained Prior to Next Visit: CMP, lipid, faye, renin, tsh, acr, plasma metaneph  Disposition: RTC in 6 weeks with me     Diagnosis:  1. Essential (primary) hypertension  EC-ECHOCARDIOGRAM COMPLETE W/O CONT    US-RENAL ARTERY DUPLEX COMP    Comp Metabolic Panel    ALDOSTERONE    RENIN PLASMA    TSH    Lipid Profile    MICROALBUMIN CREAT RATIO URINE    METANEPHRINES PLASMA    CRP HIGH SENSITIVE (CARDIAC)    REFERRAL TO SLEEP STUDIES   2. Smoker     3. Snoring  REFERRAL TO SLEEP STUDIES        History of Present Illness:   Jairon Mcclellan Jr. is a male who was seen for evaluation of resistant HTN and management. Jairon Mcclellan Jr. is referred by: Marline Degroot A.P.*.    Key HTN History:    HTN:  Current HTN concerns: Wants to get controlled DBP.    Current ADRs: No  HTN sx:  No current blurred or changed vision, chest pain, shortness of breath, headache, nausea, dizziness/vertigo   Home BP los/90s  24h ABPM completed: not tested  Adherence to current HTN meds: compliant all of the time     Antiplatelet/anticoagulation: No    Hyperlipidemia:    Stable, no current concerns  Current treatment: fenofibrate only  Myalgias? No  Other adverse drug reactions? No  Lipid profile: checked at [facility]     Pertinent HTN hx (reviewed at initial visit):   Age at HTN dx: 24  Past HTN medications: none   HTN crises:  No prior hospitalization or crises   Lifestyle factors:     High salt: Yes, Details: but minimal, mostly herbs    EtOH: No  Interfering substances:     NSAIDs: No    Decongestants.  No   Stimulants/drugs: No    Clinical evidence of ASCVD:     1) hx of MI/ACS:  No   2) coronary or other revascularization procedure: No   3) TIA/ischemic CVA: No   4) PAD (including FRANK <0.9): No   5) documented (CAD, Renal athero, AA due to athero, carotid plaque >50% stenosis: No    Major RFs:     1) Age (Men>44, women>54):  yes   2) Fhx early CAD (<55 men, <65 women):  no   3) cigarette smoking:  Yes, Details: light smoker, 6-8/day    4) high BP >139/89 or on tx: yes   5) Low HDL-C (<40 men, <50 women):  no    Other ASCVD risk factors:     T1/T2D: No   CKD:  No   Sleeping disorder/SHARONA: No   Hypothyroidism: No    Antihypertensive medication review (at time of initial visit with [facility] med):    RAAS-mediators:   1) ACEI: No   2) ARB: No   3) DRI: No  CCBs:   1) DHP: No   2) non-DHP:  No  Diuretics:   1) Thiazide-type diuretic: No   2) Loop diuretic:  No   3) Aldosterone antagonist: No   4) K+ - sparing diuretics: No  Beta-blockers:   1) cardioselective (atenolol, metoprolol): No    2) cardioselective/vasodilatory (nebivolol): No   3) non-cardioselective (propranolol): No   4) combined alpha/beta (carvedilol, labetolol): No   5) intrinsic sympathomimetic (generally not recommended): No  Peripheral alpha-blocker:  No  Central-actin) clonidine: No     2) methyldopa/guanfacine: No  Direct vasodilator:    1) hydralazine: No   2) minoxidil:  No       Problem List:     Patient Active Problem List    Diagnosis Date Noted   • Smoker 2019   • Essential (primary) hypertension 2019        Surgical History:   History reviewed. No pertinent surgical history.     Social History:     Social History     Social History   • Marital status: Single     Spouse name: N/A   • Number of children: N/A   • Years of education: N/A     Social History Main Topics   • Smoking status: Light Tobacco Smoker     Packs/day: 0.25     Years: 4.00     Types: Cigarettes   • Smokeless tobacco: Never Used      Comment: 6-8cig/day    •  "Alcohol use Yes      Comment: social    • Drug use: No   • Sexual activity: Not on file     Other Topics Concern   • Not on file     Social History Narrative   • No narrative on file        Family History:     Family History   Problem Relation Age of Onset   • Hypertension Mother    • Hypertension Father    • Hypertension Sister    • Hypertension Sister    • Hypertension Sister    • No Known Problems Child         Review of Systems:   Review of Systems   Constitutional: Negative for chills, fever and malaise/fatigue.   HENT: Negative for nosebleeds, sore throat and tinnitus.    Eyes: Negative for blurred vision and double vision.   Respiratory: Negative for cough, hemoptysis, shortness of breath and wheezing.    Cardiovascular: Negative for chest pain, palpitations, orthopnea and leg swelling.   Gastrointestinal: Negative for abdominal pain, blood in stool, diarrhea, melena, nausea and vomiting.   Genitourinary: Negative for hematuria.   Musculoskeletal: Negative for joint pain and myalgias.   Skin: Negative for itching and rash.   Neurological: Negative for dizziness, tremors, focal weakness, seizures, weakness and headaches.   Endo/Heme/Allergies: Does not bruise/bleed easily.   Psychiatric/Behavioral: Negative for depression. The patient is not nervous/anxious and does not have insomnia.         Objective:   Allergies:  Patient has no known allergies.    Vitals:    04/26/19 1530   BP: 130/93   BP Location: Left arm   Patient Position: Sitting   BP Cuff Size: Adult   Pulse: 71   Weight: 92.7 kg (204 lb 6.4 oz)   Height: 1.727 m (5' 8\")     BP Readings from Last 4 Encounters:   04/26/19 130/93   05/04/18 122/88   04/27/18 122/88   04/17/18 138/88      Body mass index is 31.08 kg/m².    Outpatient Encounter Prescriptions as of 4/26/2019   Medication Sig Dispense Refill   • fenofibrate (TRICOR) 48 MG Tab Take 48 mg by mouth every day.     • chlorthalidone (HYGROTON) 25 MG Tab Take 1 Tab by mouth every day for 360 " days. 90 Tab 3   • losartan (COZAAR) 50 MG Tab Take 1 Tab by mouth every day for 360 days. 90 Tab 3   • metoprolol (LOPRESSOR) 50 MG Tab Take 1 Tab by mouth 2 times a day for 360 days. 180 Tab 3   • amLODIPine (NORVASC) 10 MG Tab Take 10 mg by mouth every day.     • [DISCONTINUED] losartan (COZAAR) 25 MG Tab Take 25 mg by mouth every day.     • [DISCONTINUED] Metoprolol Succinate 100 MG Capsule ER 24 Hour Sprinkle Take  by mouth.     • [DISCONTINUED] MethylPREDNISolone (MEDROL PO) Take  by mouth.     • [DISCONTINUED] hydroCHLOROthiazide (HYDRODIURIL) 12.5 MG tablet Take 12.5 mg by mouth every day.     • [DISCONTINUED] diclofenac EC (VOLTAREN) 75 MG Tablet Delayed Response Take 1 Tab by mouth 2 times a day. (Patient not taking: Reported on 4/26/2019) 60 Tab 1     No facility-administered encounter medications on file as of 4/26/2019.      Physical Exam   Constitutional: He is oriented to person, place, and time. He appears well-developed and well-nourished. He is cooperative. No distress.   HENT:   Head: Normocephalic and atraumatic.   Mouth/Throat: Oropharynx is clear and moist and mucous membranes are normal.   Eyes: Pupils are equal, round, and reactive to light. Conjunctivae are normal.   Neck: Trachea normal and normal range of motion. Neck supple. Normal carotid pulses and no JVD present. Carotid bruit is not present. No thyromegaly present.   Cardiovascular: Normal rate, regular rhythm, normal heart sounds and intact distal pulses.  Exam reveals no gallop and no friction rub.    No murmur heard.  Pulses:       Carotid pulses are 2+ on the right side, and 2+ on the left side.       Radial pulses are 2+ on the right side, and 2+ on the left side.        Dorsalis pedis pulses are 2+ on the right side, and 2+ on the left side.        Posterior tibial pulses are 2+ on the right side, and 2+ on the left side.   Edema:    RLE: none     LLE: none   Spider telangectasia:       RLE:  None      LLE: none   Varicosities:          RLE: none      LLE: none   Corona phlebectatica:      RLE:  None        LLE:  None   Cording:         RLE:  None     LLE: None    Pulmonary/Chest: Effort normal and breath sounds normal. No respiratory distress.   Abdominal: Soft. Bowel sounds are normal. He exhibits no distension and no mass. There is no hepatosplenomegaly. There is no tenderness. There is no rebound and no guarding.   Musculoskeletal: He exhibits no edema.   Lymphadenopathy:     He has no cervical adenopathy.   Neurological: He is alert and oriented to person, place, and time. No cranial nerve deficit. Coordination and gait normal.   Skin: Skin is warm and dry. He is not diaphoretic. No cyanosis. No pallor. Nails show no clubbing.   Psychiatric: He has a normal mood and affect.        Accessory Clinical Findings:   Echocardiogram:  No results found for this or any previous visit.                            Joni Holman M.D.

## 2019-06-14 ENCOUNTER — APPOINTMENT (OUTPATIENT)
Dept: VASCULAR LAB | Facility: MEDICAL CENTER | Age: 49
End: 2019-06-14
Payer: COMMERCIAL

## 2019-12-20 ENCOUNTER — TELEPHONE (OUTPATIENT)
Dept: VASCULAR LAB | Facility: MEDICAL CENTER | Age: 49
End: 2019-12-20

## 2020-01-30 ENCOUNTER — TELEPHONE (OUTPATIENT)
Dept: VASCULAR LAB | Facility: MEDICAL CENTER | Age: 50
End: 2020-01-30

## 2020-04-07 ENCOUNTER — TELEPHONE (OUTPATIENT)
Dept: VASCULAR LAB | Facility: MEDICAL CENTER | Age: 50
End: 2020-04-07

## 2020-04-07 NOTE — TELEPHONE ENCOUNTER
Patient overdue for follow-up appt with vascular care.  Medical assistant has been unable to reach and reschedule  Multiple messages have been left  Await further patient contact.  Pending further contact, will defer all vascular care, including management of cardiac vascular risk factors, to PCP    Michael J. Bloch, MD  Vascular Care    Cc:

## 2020-08-20 ENCOUNTER — APPOINTMENT (OUTPATIENT)
Dept: RADIOLOGY | Facility: MEDICAL CENTER | Age: 50
End: 2020-08-20
Attending: EMERGENCY MEDICINE
Payer: MEDICAID

## 2020-08-20 ENCOUNTER — HOSPITAL ENCOUNTER (EMERGENCY)
Facility: MEDICAL CENTER | Age: 50
End: 2020-08-20
Attending: EMERGENCY MEDICINE | Admitting: EMERGENCY MEDICINE
Payer: MEDICAID

## 2020-08-20 VITALS
HEART RATE: 86 BPM | DIASTOLIC BLOOD PRESSURE: 127 MMHG | HEIGHT: 68 IN | WEIGHT: 195 LBS | OXYGEN SATURATION: 98 % | TEMPERATURE: 98 F | RESPIRATION RATE: 16 BRPM | BODY MASS INDEX: 29.55 KG/M2 | SYSTOLIC BLOOD PRESSURE: 217 MMHG

## 2020-08-20 DIAGNOSIS — M79.671 FOOT PAIN, RIGHT: ICD-10-CM

## 2020-08-20 PROCEDURE — 73630 X-RAY EXAM OF FOOT: CPT | Mod: RT

## 2020-08-20 PROCEDURE — 99284 EMERGENCY DEPT VISIT MOD MDM: CPT

## 2020-08-20 PROCEDURE — 700102 HCHG RX REV CODE 250 W/ 637 OVERRIDE(OP): Performed by: EMERGENCY MEDICINE

## 2020-08-20 PROCEDURE — A9270 NON-COVERED ITEM OR SERVICE: HCPCS | Performed by: EMERGENCY MEDICINE

## 2020-08-20 RX ORDER — ACETAMINOPHEN 325 MG/1
650 TABLET ORAL ONCE
Status: COMPLETED | OUTPATIENT
Start: 2020-08-20 | End: 2020-08-20

## 2020-08-20 RX ORDER — IBUPROFEN 600 MG/1
600 TABLET ORAL ONCE
Status: COMPLETED | OUTPATIENT
Start: 2020-08-20 | End: 2020-08-20

## 2020-08-20 RX ORDER — FENOFIBRATE 48 MG/1
48 TABLET, COATED ORAL DAILY
COMMUNITY

## 2020-08-20 RX ADMIN — IBUPROFEN 600 MG: 600 TABLET, FILM COATED ORAL at 12:02

## 2020-08-20 RX ADMIN — ACETAMINOPHEN 650 MG: 325 TABLET, FILM COATED ORAL at 12:02

## 2020-08-20 SDOH — HEALTH STABILITY: MENTAL HEALTH: HOW OFTEN DO YOU HAVE A DRINK CONTAINING ALCOHOL?: 2-4 TIMES A MONTH

## 2020-08-20 ASSESSMENT — PAIN DESCRIPTION - DESCRIPTORS: DESCRIPTORS: THROBBING

## 2020-08-20 NOTE — DISCHARGE INSTRUCTIONS
We could not find the cause of your pain.  Wear a postop shoe or other stiff shoe if helpful.  Elevate and ice the foot.  Take ibuprofen and Tylenol for pain.  If not better in 1 week follow-up with Ortho Dr. Vincent.  Return for redness swelling red streaks fever or other more concerning symptoms.    You had a borderline or high normal blood pressure reading today.  This does not necessarily mean you have hypertension.  Please followup with your/a primary physician for comprehensive blood pressure evaluation and yearly fasting cholesterol assessment.  BP Readings from Last 3 Encounters:   08/20/20 (!) 180/111   04/26/19 130/93   05/04/18 122/88

## 2020-08-20 NOTE — ED TRIAGE NOTES
Jairon Mcclellan Jr.  Chief Complaint   Patient presents with   • Foot Pain     and swelling since yesterday (R)     Pt to triage in w/c with above complaint.  Elevated BP noted, no acute distress.  Hx of htn, did not take home meds today.   Pt states yesterday onset of pain and swelling to (R) foot,  Increased with movement and palpitation. CMS intact.   Denies trauma  Pt/staff masked and in appropriate PPE during encounter.   Pt returned to lobby, educated on triage process, and to inform staff of any changes or concerns.

## 2020-08-20 NOTE — ED PROVIDER NOTES
ED Provider Note    Scribed for Marcus Barker M.D. by Aida Johnston. 8/20/2020  11:20 AM    Primary care provider: Pcp Pt States None  Means of arrival: Walk-in  History obtained from: Patient  History limited by: None    CHIEF COMPLAINT  Chief Complaint   Patient presents with   • Foot Pain     and swelling since yesterday (R)       DANIELLE Mcclellan Jr. is a 50 y.o. male who presents to the Emergency Department with right foot pain onset yesterday morning. Patient endorses right foot edema, but no erythema or numbness. He rates his pain an 8/10 in severity. Patient states he is able to ambulate without difficulty. No alleviating or exacerbating factors noted. Patient has no history of gout. He denies any history of diabetes or neuropathy.     REVIEW OF SYSTEMS  Pertinent positives include: right foot pain and edema.  Pertinent negatives include: foot erythema or numbness.      PAST MEDICAL HISTORY  Past Medical History:   Diagnosis Date   • HTN (hypertension)    • Nicotine dependence with current use        FAMILY HISTORY  Family History   Problem Relation Age of Onset   • Hypertension Mother    • Hypertension Father    • Hypertension Sister    • Hypertension Sister    • Hypertension Sister    • No Known Problems Child        SOCIAL HISTORY  Social History     Tobacco Use   • Smoking status: Light Tobacco Smoker     Packs/day: 0.25     Years: 4.00     Pack years: 1.00     Types: Cigarettes   • Smokeless tobacco: Never Used   • Tobacco comment: 6-8cig/day    Substance Use Topics   • Alcohol use: Yes     Frequency: 2-4 times a month     Comment: social    • Drug use: No     Social History     Substance and Sexual Activity   Drug Use No       SURGICAL HISTORY  None noted.    CURRENT MEDICATIONS  Home Medications     Reviewed by Alexandra Barrios R.N. (Registered Nurse) on 08/20/20 at 1037  Med List Status: Partial   Medication Last Dose Status   fenofibrate (TRICOR) 48 MG Tab  Active   metoprolol (LOPRESSOR) 25 MG Tab   "Active                ALLERGIES  No Known Allergies    PHYSICAL EXAM  VITAL SIGNS: BP (!) 180/111   Pulse 95   Temp 37 °C (98.6 °F) (Temporal)   Resp 16   Ht 1.727 m (5' 8\")   Wt 88.5 kg (195 lb)   SpO2 97%   BMI 29.65 kg/m²   Reviewed and hypertensive   Musculoskeletal: possible edema of right foot, tenderness of the lateral aspect of the mid foot. Proximal leg down leg ankle and foot non tender.  Skin: No erythma normal warmth.     Neurologic: Toe flexion extension and sensation preserved.  Psychiatric: Affect normal, Judgment normal, Mood normal.     DIFFERENTIAL DIAGNOSIS:  fracture, tendonities, bursitis, gout, or cellulits.    RADIOLOGY/PROCEDURES  DX-FOOT-COMPLETE 3+ RIGHT   Final Result      No acute osseous abnormality.        Radiologist interpretation have been reviewed by me.     INTERVENTIONS  Medications   ibuprofen (MOTRIN) tablet 600 mg (600 mg Oral Given 8/20/20 1202)   acetaminophen (TYLENOL) tablet 650 mg (650 mg Oral Given 8/20/20 1202)     Response: slightly improved.    ED COURSE:  Nursing notes, VS, PMSFHx reviewed in chart.     11:20 AM - Patient seen and examined at bedside with family. Patient currently with foot pain, and would like pain medication. Patient will be treated with Motrin 600 mg and Tylenol 650 mg for his symptoms. Ordered Dx-foot to evaluate.     1:30 PM - Patient was seen at bedside. He reports feeling slightly improved, following medications. I updated him on all diagnostic results, and that the best course of action is for him to follow up with Ortho in 1 week. I advised him of all return precautions. Patient verbalizes understanding and agreement to this plan of care.     MEDICAL DECISION MAKING:  Well-appearing patient presents with foot pain of unclear etiology.  There is no evidence of fracture gout or cellulitis.  There may be tendinitis or bursitis.    PLAN:  The patient will be discharged home  Foot pain handout given  Return for redness, swelling, red " streaks, fever, or other concerning symptoms.  Postop shoe given  Rice ibuprofen and Tylenol    Enrique Vincent M.D.  [address] [place] [address]  [phone]    Schedule an appointment as soon as possible for a visit   As needed if not better one week      CONDITION: stable.     FINAL IMPRESSION  1. Foot pain, right          IAida (Scribe), am scribing for, and in the presence of, Marcus Barker M.D..    Electronically signed by: Aida Johnston (Scribe), 8/20/2020    IMarcus M.D. personally performed the services described in this documentation, as scribed by Aida Johnston in my presence, and it is both accurate and complete. C.    The note accurately reflects work and decisions made by me.  Marcus Barker M.D.  8/20/2020  4:45 PM

## 2020-12-03 ENCOUNTER — HOSPITAL ENCOUNTER (OUTPATIENT)
Dept: RADIOLOGY | Facility: MEDICAL CENTER | Age: 50
End: 2020-12-03
Attending: INTERNAL MEDICINE
Payer: MEDICAID

## 2020-12-03 DIAGNOSIS — E78.5 HYPERLIPIDEMIA, UNSPECIFIED HYPERLIPIDEMIA TYPE: ICD-10-CM

## 2020-12-16 ENCOUNTER — HOSPITAL ENCOUNTER (OUTPATIENT)
Facility: MEDICAL CENTER | Age: 50
End: 2020-12-16
Attending: SURGERY
Payer: MEDICAID

## 2020-12-16 PROCEDURE — 88305 TISSUE EXAM BY PATHOLOGIST: CPT

## 2020-12-16 PROCEDURE — 88304 TISSUE EXAM BY PATHOLOGIST: CPT

## 2020-12-17 LAB — PATHOLOGY CONSULT NOTE: NORMAL
